# Patient Record
Sex: FEMALE | Race: WHITE | NOT HISPANIC OR LATINO | URBAN - METROPOLITAN AREA
[De-identification: names, ages, dates, MRNs, and addresses within clinical notes are randomized per-mention and may not be internally consistent; named-entity substitution may affect disease eponyms.]

---

## 2018-01-27 ENCOUNTER — OUTPATIENT (OUTPATIENT)
Dept: OUTPATIENT SERVICES | Facility: HOSPITAL | Age: 28
LOS: 1 days | Discharge: HOME | End: 2018-01-27

## 2018-01-27 DIAGNOSIS — E78.5 HYPERLIPIDEMIA, UNSPECIFIED: ICD-10-CM

## 2018-01-27 DIAGNOSIS — D53.9 NUTRITIONAL ANEMIA, UNSPECIFIED: ICD-10-CM

## 2018-01-27 DIAGNOSIS — E11.9 TYPE 2 DIABETES MELLITUS WITHOUT COMPLICATIONS: ICD-10-CM

## 2018-01-27 DIAGNOSIS — N36.0 URETHRAL FISTULA: ICD-10-CM

## 2018-01-27 DIAGNOSIS — E03.9 HYPOTHYROIDISM, UNSPECIFIED: ICD-10-CM

## 2019-03-25 ENCOUNTER — EMERGENCY (EMERGENCY)
Facility: HOSPITAL | Age: 29
LOS: 0 days | Discharge: HOME | End: 2019-03-25
Admitting: EMERGENCY MEDICINE

## 2019-03-25 VITALS
WEIGHT: 190.04 LBS | DIASTOLIC BLOOD PRESSURE: 80 MMHG | TEMPERATURE: 96 F | HEART RATE: 88 BPM | RESPIRATION RATE: 18 BRPM | SYSTOLIC BLOOD PRESSURE: 135 MMHG | OXYGEN SATURATION: 100 %

## 2019-03-25 DIAGNOSIS — Z79.1 LONG TERM (CURRENT) USE OF NON-STEROIDAL ANTI-INFLAMMATORIES (NSAID): ICD-10-CM

## 2019-03-25 DIAGNOSIS — Z79.899 OTHER LONG TERM (CURRENT) DRUG THERAPY: ICD-10-CM

## 2019-03-25 DIAGNOSIS — M54.9 DORSALGIA, UNSPECIFIED: ICD-10-CM

## 2019-03-25 DIAGNOSIS — M54.5 LOW BACK PAIN: ICD-10-CM

## 2019-03-25 RX ORDER — METHOCARBAMOL 500 MG/1
2 TABLET, FILM COATED ORAL
Qty: 30 | Refills: 0 | OUTPATIENT
Start: 2019-03-25 | End: 2019-03-29

## 2019-03-25 RX ORDER — IBUPROFEN 200 MG
1 TABLET ORAL
Qty: 28 | Refills: 0 | OUTPATIENT
Start: 2019-03-25 | End: 2019-03-31

## 2019-03-25 RX ORDER — METHOCARBAMOL 500 MG/1
1000 TABLET, FILM COATED ORAL ONCE
Qty: 0 | Refills: 0 | Status: COMPLETED | OUTPATIENT
Start: 2019-03-25 | End: 2019-03-25

## 2019-03-25 RX ORDER — KETOROLAC TROMETHAMINE 30 MG/ML
30 SYRINGE (ML) INJECTION ONCE
Qty: 0 | Refills: 0 | Status: DISCONTINUED | OUTPATIENT
Start: 2019-03-25 | End: 2019-03-25

## 2019-03-25 RX ADMIN — Medication 30 MILLIGRAM(S): at 22:33

## 2019-03-25 RX ADMIN — METHOCARBAMOL 1000 MILLIGRAM(S): 500 TABLET, FILM COATED ORAL at 22:34

## 2019-03-25 NOTE — ED PROVIDER NOTE - CLINICAL SUMMARY MEDICAL DECISION MAKING FREE TEXT BOX
27 yo F with no significant PMHx presents to the ED c/o mild non-traumatic left sided low back pain that started earlier today while sitting. Symptoms have been persisting so she came to ED for evaluation. Pain radiates into her left buttock. She applied tiger balm with mild relief of symptoms. Pain is worse with movement and relieved with rest. She denies other complaints. Exam shows mild left lumbar paraspinal TTP. No neuro deficits. Pain improved with toradol/robaxin. Pt instructed to follow-up with PMD/rehab clinic. Return precautions given. Pt agreeable to d/c.

## 2019-03-25 NOTE — ED PROVIDER NOTE - PHYSICAL EXAMINATION
VITAL SIGNS: I have reviewed nursing notes and confirm.  CONSTITUTIONAL: Well-developed; well-nourished; in no acute distress.  SKIN: Skin exam is warm and dry, no acute rash.  HEAD: Normocephalic; atraumatic.  EYES: Conjunctiva and sclera clear.  NECK: Supple; non tender.  CARD: S1, S2 normal; no murmurs, gallops, or rubs. Regular rate and rhythm.  RESP: No wheezes, rales or rhonchi. Speaking in full sentences.   ABD: Normal bowel sounds; soft; non-distended; non-tender; No rebound or guarding. No CVA tenderness.  BACK: No midline TTP. (+) mild left lumbar paraspinal TTP.   EXT: Normal ROM. No clubbing, cyanosis or edema.  NEURO: Alert, oriented. Grossly unremarkable. No focal deficits. CN II-XII intact. No dysmetria. No ataxia. Sensation intact and equal throughout. Strength 5/5 throughout. Gait steady.

## 2019-03-25 NOTE — ED PROVIDER NOTE - NSFOLLOWUPCLINICS_GEN_ALL_ED_FT
[facility]  Rehabilitation  [address] [place] [address]  Phone: [phone]  Fax:   Follow Up Time: 1-3 Days

## 2020-01-01 NOTE — ED PROVIDER NOTE - OBJECTIVE STATEMENT
29 yo F with no significant PMHx presents to the ED c/o mild non-traumatic left sided low back pain that started earlier today while sitting. Symptoms have been persisting so she came to ED for evaluation. Pain radiates into her left buttock. She applied tiger balm with mild relief of symptoms. Pain is worse with movement and relieved with rest. She denies other complaints. Pt denies fever, chills, nausea, vomiting, abdominal pain, diarrhea, headache, dizziness, weakness, chest pain, SOB, numbness, bowel/bladder dysfunction, LOC, trauma, urinary symptoms. Statement Selected

## 2020-05-21 NOTE — ED ADULT NURSE NOTE - NS ED NOTE  TALK SOMEONE YN
Occupational Therapy   Occupational Therapy Initial Assessment  Date: 2020   Patient Name: Puma Quezada  MRN: [id number]     : 1947    Date of Service: 2020    Discharge Recommendations:  Patient would benefit from continued therapy after discharge, 3-5 sessions per week  OT Equipment Recommendations  Other: defer to  [facility] scored a 15/24 on the AM-PAC ADL Inpatient form. Current research shows that an AM-PAC score of 17 or less is typically not associated with a discharge to the patient's home setting. Based on the patient's AM-PAC score and their current ADL deficits, it is recommended that the patient have 3-5 sessions per week of Occupational Therapy at d/c to increase the patient's independence. At this time, this patient lacks the endurance, and/or tolerance for 3 hours of therapy/day, 5-7x/wk and would benefit most from a follow up treatment frequency of 3-5x/wk. Please see assessment section for further patient specific details. If patient discharges prior to next session this note will serve as a discharge summary. Please see below for the latest assessment towards goals. Assessment   Performance deficits / Impairments: Decreased functional mobility ; Decreased strength;Decreased endurance;Decreased ADL status; Decreased safe awareness;Decreased cognition;Decreased balance;Decreased high-level IADLs  Assessment: 68 y/o female admitted  with UTI and sepsis. PTA pt lived at home alone and was independent with ADLs and functional mobility. Today, pt somewhat disoriented to recent events/situation. Pt tolerated ambulated short distance in room with RW and CGA. Pt slow but no major LOB. Pt with complain of legs hurting behind her knees limiting mobility. Pt with functional UE ROM for self care. Anticipate pt will require up to max A for LB ADls at this time. Pt is functioning below baseline and would benefit from skilled therapy at this time.    Prognosis: No History  Social/Functional History  Lives With: Alone  Type of Home: House(multi family house, pt lives on first floor)  Home Layout: One level  Home Access: Stairs to enter with rails  Entrance Stairs - Number of Steps: 4  Entrance Stairs - Rails: Both  Bathroom Shower/Tub: Tub/Shower unit, Shower chair with back  Bathroom Toilet: Standard  ADL Assistance: Independent  Homemaking Assistance: Independent  Homemaking Responsibilities: Yes  Ambulation Assistance: Independent  Transfer Assistance: Independent  Active : No  Mode of Transportation: Bus  Occupation: Retired  Leisure & Hobbies: going to [employer]  Additional Comments: Patient states she walks to the opvizor. Objective        Orientation  Overall Orientation Status: Impaired(somewhat confused to recent situation/events. [facility] setting)  Orientation Level: Oriented to person     Balance  Sitting Balance: Stand by assistance  Standing Balance: Contact guard assistance  Functional Mobility  Functional Mobility Comments: far side of bed to chair with RW and CGA. Required increase time due to knee pain but no major LOB  ADL  Toileting: Dependent/Total(conley)  Additional Comments: Anticipate pt will require max A for LB bathing/dressing, min A for UB bathing/dressing and SBA for grooming tasks based on balance, strength, and cognition observed        Bed mobility  Supine to Sit: Stand by assistance(HOB elevated, use of bedrail)  Sit to Supine: Unable to assess(Pt in chair at end of session)  Transfers  Sit to stand: Contact guard assistance(increase time and effort 2/2 knee pain)  Stand to sit: Contact guard assistance  Transfer Comments: to/from Rw       Vision - Basic Assessment  Visual History: (cataract surgery)     Cognition  Overall Cognitive Status: Exceptions  Arousal/Alertness: Appropriate responses to stimuli  Following Commands:  Follows one step commands with increased time  Attention Span: Attends with cues to redirect  Memory: Decreased recall of recent events;Decreased short term memory  Safety Judgement: Decreased awareness of need for assistance;Decreased awareness of need for safety  Insights: Decreased awareness of deficits  Perception  Overall Perceptual Status: WFL     Sensation  Overall Sensation Status: WFL           LUE Strength  LUE Strength Comment: grossly functional  RUE Strength  RUE Strength Comment: grossly functional             Plan   Plan  Times per week: 3-5  Current Treatment Recommendations: Strengthening, Functional Mobility Training, Gait Training, Endurance Training, Stair training, Balance Training, Self-Care / ADL      AM-PAC Score  AM-PAC Inpatient Daily Activity Raw Score: 15 (05/21/20 1508)  AM-PAC Inpatient ADL T-Scale Score : 34.69 (05/21/20 1508)  ADL Inpatient CMS 0-100% Score: 56.46 (05/21/20 1508)  ADL Inpatient CMS G-Code Modifier : CK (05/21/20 1508)    Goals  Short term goals  Time Frame for Short term goals: Prior to DC: Short term goal 1: Pt will complete ADL transfers/mobility with supervision  Short term goal 2: Pt will tolerate standing >5 min for functional task with supervision  Short term goal 3: Pt will complete LB dressing with supervision  Short term goal 4: pt will complete toileting with supervision   Long term goals  Time Frame for Long term goals : stgs=ltgs  Patient Goals   Patient goals : to return home       Therapy Time   Individual Concurrent Group Co-treatment   Time In 1420         Time Out 1500         Minutes 40         Timed Code Treatment Minutes: 40 Minutes     This note to serve as OT d/c summary if pt is d/c-ed prior to next therapy session.     Zoey Marcos OTR/L

## 2021-02-12 NOTE — ED ADULT TRIAGE NOTE - RESPIRATORY RATE (BREATHS/MIN)
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 Complex glandular structures with irregular ducts are observed.  Papillary and solid areas are also present.  Cells, some of which have apocrine snouts, show polymorphism and atypia.  Mitotic activity is increased.